# Patient Record
Sex: FEMALE | Race: WHITE | NOT HISPANIC OR LATINO | Employment: FULL TIME | URBAN - METROPOLITAN AREA
[De-identification: names, ages, dates, MRNs, and addresses within clinical notes are randomized per-mention and may not be internally consistent; named-entity substitution may affect disease eponyms.]

---

## 2018-02-02 ENCOUNTER — TELEPHONE (OUTPATIENT)
Dept: CARDIOLOGY | Facility: CLINIC | Age: 23
End: 2018-02-02

## 2018-02-02 NOTE — TELEPHONE ENCOUNTER
Pt's mother called stating the pt has been having sharp stabbing CP for 2 weeks.  States the pain radiates between her shoulder blades in her back.      Pts last OV 6/2016.  Pt was advised to ED BHE.  Pts mother states she will try to get her to the ED.  She also states the pt may refuse.      [facility] REJI Float.

## 2018-03-12 ENCOUNTER — OFFICE VISIT (OUTPATIENT)
Dept: CARDIOLOGY | Facility: CLINIC | Age: 23
End: 2018-03-12

## 2018-03-12 VITALS
HEART RATE: 74 BPM | DIASTOLIC BLOOD PRESSURE: 98 MMHG | BODY MASS INDEX: 43.99 KG/M2 | HEIGHT: 65 IN | WEIGHT: 264 LBS | SYSTOLIC BLOOD PRESSURE: 140 MMHG

## 2018-03-12 DIAGNOSIS — I36.1 NON-RHEUMATIC TRICUSPID VALVE INSUFFICIENCY: ICD-10-CM

## 2018-03-12 DIAGNOSIS — I34.0 NON-RHEUMATIC MITRAL REGURGITATION: Primary | ICD-10-CM

## 2018-03-12 DIAGNOSIS — R07.2 PRECORDIAL PAIN: ICD-10-CM

## 2018-03-12 DIAGNOSIS — IMO0001 CLASS 3 OBESITY WITH BODY MASS INDEX (BMI) OF 40.0 TO 44.9 IN ADULT, UNSPECIFIED OBESITY TYPE, UNSPECIFIED WHETHER SERIOUS COMORBIDITY PRESENT: ICD-10-CM

## 2018-03-12 PROCEDURE — 99214 OFFICE O/P EST MOD 30 MIN: CPT | Performed by: NURSE PRACTITIONER

## 2018-03-12 PROCEDURE — 93000 ELECTROCARDIOGRAM COMPLETE: CPT | Performed by: NURSE PRACTITIONER

## 2018-03-12 RX ORDER — ERGOCALCIFEROL 1.25 MG/1
50000 CAPSULE ORAL WEEKLY
COMMUNITY
End: 2020-05-18

## 2018-03-12 NOTE — PROGRESS NOTES
Date of Office Visit: 2018  Encounter Provider: MARTINEZ Bello  Place of Service: [facility] CARDIOLOGY  Patient Name: Vivien Morales  :1995    Chief Complaint   Patient presents with   • Cardiac Valve Problem   • Chest Pain   • Fatigue   :     HPI: Vivien Morales is a 22 y.o. female is a patient of Dr. meyers. I am seeing her for the first time today and have reviewed her records.  She is a patient of Dr. Edouard uGan.    Her past medical history is significant of hypothyroidism, depression, asthma, obesity, mild to moderate mitral regurgitation and tricuspid regurgitation (2015).    She had an exercise treadmill stress test on 2015 which was normal.  She had a CTA of the chest on 2015 for chest pain revealed contrast opacification of pulmonary arteries and 1.4 cm hyperenhancing lesion within the superior aspect of the left lobe of the liver.  She had a transthoracic echocardiogram completed on 16, revealed normal left ventricular function with a calculated EF of 64.5%.  Mitral valve leaflets were thickened with no obvious mitral valve prolapse.  There was also mild to moderate tricuspid valve regurgitation present with a normal RVSP.    She was last seen in the office in 2016. At that time she was complaining of some atypical chest pain and right scapular pain.  She had a ultrasound of the right upper quadrant that revealed findings suggestive of mild hepatic steatosis.  She had a cholecystectomy in  due to cholecystitis.     Since today for follow-up and has complained of fatigue and chest pain.  She describes the pain is more of a pressure that is intermittent and last from 5 minutes to one hour.  The chest pressure is not associated with eating or deep breaths.  And a couple times there has been shooting pain that lasts for a couple minutes and spontaneously resolves.  Her last episode of chest pressure was 1 month ago and  "she rates the pain a 3-6/10.   No Known Allergies     Past Medical History:   Diagnosis Date   • Asthma    • Depression    • Hypothyroidism    • Mitral regurgitation     Mild to moderate MR by echo 1/8/15   • Obesity    • Tricuspid regurgitation     Mild to moderate TR by echo on 1/8/15       Past Surgical History:   Procedure Laterality Date   • CHOLECYSTECTOMY  08/2016       Family and social history reviewed.     Review of Systems   Constitution: Positive for malaise/fatigue.   Cardiovascular: Positive for chest pain.   Respiratory: Negative for shortness of breath.      All other systems were reviewed and are negative          Objective:     Vitals:    03/12/18 1003   BP: 140/98   BP Location: Left arm   Patient Position: Sitting   Pulse: 74   Weight: 120 kg (264 lb)   Height: 165.1 cm (65\")     Body mass index is 43.93 kg/m².    PHYSICAL EXAM:  Physical Exam   Constitutional: She is oriented to person, place, and time. She appears well-developed and well-nourished. No distress.   obese   HENT:   Head: Normocephalic.   Eyes: Conjunctivae are normal.   Neck: Normal range of motion. No JVD present.   Cardiovascular: Normal rate, regular rhythm and intact distal pulses.  Exam reveals distant heart sounds.    No murmur heard.  Pulses:       Carotid pulses are 2+ on the right side, and 2+ on the left side.       Radial pulses are 2+ on the right side, and 2+ on the left side.   Pulmonary/Chest: Effort normal and breath sounds normal. No respiratory distress. She has no wheezes. She has no rhonchi. She has no rales. She exhibits no tenderness.   Abdominal: Soft. Bowel sounds are normal. She exhibits no distension.   Musculoskeletal: Normal range of motion. She exhibits no edema.   Neurological: She is alert and oriented to person, place, and time.   Skin: Skin is warm, dry and intact. No rash noted. She is not diaphoretic. No cyanosis.   Psychiatric: She has a normal mood and affect. Her behavior is normal. Judgment " and thought content normal.         ECG 12 Lead  Date/Time: 3/12/2018 9:52 AM  Performed by: BOBO ALMANZAR  Authorized by: BOBO ALMANZAR   Comparison: compared with previous ECG from 2/6/2018  Rhythm: sinus rhythm  Rate: normal  BPM: 74  ST Segments: ST segments normal  T Waves: T waves normal  QRS axis: normal  Clinical impression: normal ECG          Current Outpatient Prescriptions   Medication Sig Dispense Refill   • metaxalone (SKELAXIN) 800 MG tablet Take  by mouth.     • PROVENTIL  (90 BASE) MCG/ACT inhaler INL 2 PFS PO Q 6 H PRF WHZ  5   • vitamin D (ERGOCALCIFEROL) 47853 units capsule capsule Take 50,000 Units by mouth 1 (One) Time Per Week.       No current facility-administered medications for this visit.      Assessment:       Diagnosis Plan   1. Non-rheumatic mitral regurgitation  Adult Transthoracic Echo Complete W/ Cont if Necessary Per Protocol   2. Non-rheumatic tricuspid valve insufficiency  Adult Transthoracic Echo Complete W/ Cont if Necessary Per Protocol   3. Class 3 obesity with body mass index (BMI) of 40.0 to 44.9 in adult, unspecified obesity type, unspecified whether serious comorbidity present     4. Precordial pain  Adult Transthoracic Echo Complete W/ Cont if Necessary Per Protocol        Orders Placed This Encounter   Procedures   • ECG 12 Lead     This order was created via procedure documentation   • Adult Transthoracic Echo Complete W/ Cont if Necessary Per Protocol     Standing Status:   Future     Standing Expiration Date:   3/12/2019     Order Specific Question:   Reason for exam?     Answer:   Valvular Function     Order Specific Question:   Valvular Function specification?     Answer:   Native Valvular Regurg     Order Specific Question:   Native Valvular Regurg severity?     Answer:   Mild     Order Specific Question:   Native Valvular Regurg severity?     Answer:   Moderate       Plan:    1. Precordial pain- atypical chest pain, rated at 3-6/10 on pain scale. Last  episode was 1 onth ago. She describes it mainly being pressure with a couple episodes of shooting pain that last a couple minutes. The overall pressure can last up to an hr. Will repeat transthoracic echocardiogram to evaluate valvular function.    2. Mitral regurgitation- June 2016 echocardiogram revealed Mitral valve leaflets were thickened with no obvious mitral valve prolapse. Will repeat     3. Tricuspid regurgitation- mild grading on last echo. Will repeat     4. Morbid obesity- she does not routinely exercise and we discussed a goal of 30-45 minutes of exercise such as walking, 4 days per week.    5. Hypothyroidism- on levothyroxine      Plan of care reviewed with Dr. Colorado  Follow up in %%%  Patient was instructed to call the office if new symptoms develop or report to nearest ER if heart attack or stroke is suspected.        It has been a pleasure to participate in this patient's care.      Thank you,  MARTINEZ Bello

## 2018-03-16 ENCOUNTER — HOSPITAL ENCOUNTER (OUTPATIENT)
Dept: CARDIOLOGY | Facility: HOSPITAL | Age: 23
Discharge: HOME | End: 2018-03-16
Admitting: NURSE PRACTITIONER

## 2018-03-16 ENCOUNTER — TELEPHONE (OUTPATIENT)
Dept: CARDIOLOGY | Facility: CLINIC | Age: 23
End: 2018-03-16

## 2018-03-16 VITALS
HEIGHT: 65 IN | DIASTOLIC BLOOD PRESSURE: 90 MMHG | WEIGHT: 264 LBS | BODY MASS INDEX: 43.99 KG/M2 | HEART RATE: 67 BPM | SYSTOLIC BLOOD PRESSURE: 140 MMHG

## 2018-03-16 DIAGNOSIS — R07.2 PRECORDIAL PAIN: ICD-10-CM

## 2018-03-16 DIAGNOSIS — I34.0 NON-RHEUMATIC MITRAL REGURGITATION: ICD-10-CM

## 2018-03-16 DIAGNOSIS — I36.1 NON-RHEUMATIC TRICUSPID VALVE INSUFFICIENCY: ICD-10-CM

## 2018-03-16 LAB
ASCENDING AORTA: 2.3 CM
BH CV ECHO MEAS - ACS: 1.8 CM
BH CV ECHO MEAS - AO MAX PG (FULL): 1.9 MMHG
BH CV ECHO MEAS - AO MAX PG: 6.8 MMHG
BH CV ECHO MEAS - AO MEAN PG (FULL): 1.4 MMHG
BH CV ECHO MEAS - AO MEAN PG: 4.5 MMHG
BH CV ECHO MEAS - AO ROOT AREA (BSA CORRECTED): 1.1
BH CV ECHO MEAS - AO ROOT AREA: 5.1 CM^2
BH CV ECHO MEAS - AO ROOT DIAM: 2.5 CM
BH CV ECHO MEAS - AO V2 MAX: 129.9 CM/SEC
BH CV ECHO MEAS - AO V2 MEAN: 102 CM/SEC
BH CV ECHO MEAS - AO V2 VTI: 29.8 CM
BH CV ECHO MEAS - AVA(I,A): 2 CM^2
BH CV ECHO MEAS - AVA(I,D): 2 CM^2
BH CV ECHO MEAS - AVA(V,A): 2.1 CM^2
BH CV ECHO MEAS - AVA(V,D): 2.1 CM^2
BH CV ECHO MEAS - BSA(HAYCOCK): 2.4 M^2
BH CV ECHO MEAS - BSA: 2.2 M^2
BH CV ECHO MEAS - BZI_BMI: 43.9 KILOGRAMS/M^2
BH CV ECHO MEAS - BZI_METRIC_HEIGHT: 165.1 CM
BH CV ECHO MEAS - BZI_METRIC_WEIGHT: 119.8 KG
BH CV ECHO MEAS - CONTRAST EF (2CH): 60 ML/M^2
BH CV ECHO MEAS - CONTRAST EF 4CH: 60.5 ML/M^2
BH CV ECHO MEAS - EDV(MOD-SP2): 95 ML
BH CV ECHO MEAS - EDV(MOD-SP4): 76 ML
BH CV ECHO MEAS - EDV(TEICH): 116.9 ML
BH CV ECHO MEAS - EF(CUBED): 80.9 %
BH CV ECHO MEAS - EF(MOD-SP2): 60 %
BH CV ECHO MEAS - EF(MOD-SP4): 60.5 %
BH CV ECHO MEAS - EF(TEICH): 73.3 %
BH CV ECHO MEAS - ESV(MOD-SP2): 38 ML
BH CV ECHO MEAS - ESV(MOD-SP4): 30 ML
BH CV ECHO MEAS - ESV(TEICH): 31.2 ML
BH CV ECHO MEAS - FS: 42.4 %
BH CV ECHO MEAS - IVS/LVPW: 1
BH CV ECHO MEAS - IVSD: 1 CM
BH CV ECHO MEAS - LAT PEAK E' VEL: 20 CM/SEC
BH CV ECHO MEAS - LV DIASTOLIC VOL/BSA (35-75): 34.1 ML/M^2
BH CV ECHO MEAS - LV MASS(C)D: 176.7 GRAMS
BH CV ECHO MEAS - LV MASS(C)DI: 79.4 GRAMS/M^2
BH CV ECHO MEAS - LV MAX PG: 4.8 MMHG
BH CV ECHO MEAS - LV MEAN PG: 3.1 MMHG
BH CV ECHO MEAS - LV SYSTOLIC VOL/BSA (12-30): 13.5 ML/M^2
BH CV ECHO MEAS - LV V1 MAX: 110.1 CM/SEC
BH CV ECHO MEAS - LV V1 MEAN: 82.7 CM/SEC
BH CV ECHO MEAS - LV V1 VTI: 24.8 CM
BH CV ECHO MEAS - LVIDD: 5 CM
BH CV ECHO MEAS - LVIDS: 2.9 CM
BH CV ECHO MEAS - LVLD AP2: 8.6 CM
BH CV ECHO MEAS - LVLD AP4: 8.2 CM
BH CV ECHO MEAS - LVLS AP2: 6.9 CM
BH CV ECHO MEAS - LVLS AP4: 6.7 CM
BH CV ECHO MEAS - LVOT AREA (M): 2.5 CM^2
BH CV ECHO MEAS - LVOT AREA: 2.4 CM^2
BH CV ECHO MEAS - LVOT DIAM: 1.8 CM
BH CV ECHO MEAS - LVPWD: 0.97 CM
BH CV ECHO MEAS - MED PEAK E' VEL: 14 CM/SEC
BH CV ECHO MEAS - MR MAX PG: 90.7 MMHG
BH CV ECHO MEAS - MR MAX VEL: 476.1 CM/SEC
BH CV ECHO MEAS - MV A DUR: 0.12 SEC
BH CV ECHO MEAS - MV A MAX VEL: 59.2 CM/SEC
BH CV ECHO MEAS - MV DEC SLOPE: 460.1 CM/SEC^2
BH CV ECHO MEAS - MV DEC TIME: 0.17 SEC
BH CV ECHO MEAS - MV E MAX VEL: 85.4 CM/SEC
BH CV ECHO MEAS - MV E/A: 1.4
BH CV ECHO MEAS - MV MAX PG: 4.4 MMHG
BH CV ECHO MEAS - MV MEAN PG: 1.8 MMHG
BH CV ECHO MEAS - MV P1/2T MAX VEL: 87.3 CM/SEC
BH CV ECHO MEAS - MV P1/2T: 55.6 MSEC
BH CV ECHO MEAS - MV V2 MAX: 104.7 CM/SEC
BH CV ECHO MEAS - MV V2 MEAN: 60.6 CM/SEC
BH CV ECHO MEAS - MV V2 VTI: 26 CM
BH CV ECHO MEAS - MVA P1/2T LCG: 2.5 CM^2
BH CV ECHO MEAS - MVA(P1/2T): 4 CM^2
BH CV ECHO MEAS - MVA(VTI): 2.3 CM^2
BH CV ECHO MEAS - PA ACC TIME: 0.16 SEC
BH CV ECHO MEAS - PA MAX PG (FULL): 2.7 MMHG
BH CV ECHO MEAS - PA MAX PG: 4.2 MMHG
BH CV ECHO MEAS - PA PR(ACCEL): 6.1 MMHG
BH CV ECHO MEAS - PA V2 MAX: 102.8 CM/SEC
BH CV ECHO MEAS - PULM A REVS DUR: 0.1 SEC
BH CV ECHO MEAS - PULM A REVS VEL: 26.7 CM/SEC
BH CV ECHO MEAS - PULM DIAS VEL: 63.5 CM/SEC
BH CV ECHO MEAS - PULM S/D: 0.86
BH CV ECHO MEAS - PULM SYS VEL: 54.8 CM/SEC
BH CV ECHO MEAS - PVA(V,A): 2.2 CM^2
BH CV ECHO MEAS - PVA(V,D): 2.2 CM^2
BH CV ECHO MEAS - QP/QS: 0.89
BH CV ECHO MEAS - RV MAX PG: 1.5 MMHG
BH CV ECHO MEAS - RV MEAN PG: 0.88 MMHG
BH CV ECHO MEAS - RV V1 MAX: 61.1 CM/SEC
BH CV ECHO MEAS - RV V1 MEAN: 44.6 CM/SEC
BH CV ECHO MEAS - RV V1 VTI: 14.8 CM
BH CV ECHO MEAS - RVOT AREA: 3.6 CM^2
BH CV ECHO MEAS - RVOT DIAM: 2.2 CM
BH CV ECHO MEAS - SI(AO): 68 ML/M^2
BH CV ECHO MEAS - SI(CUBED): 44.8 ML/M^2
BH CV ECHO MEAS - SI(LVOT): 27.2 ML/M^2
BH CV ECHO MEAS - SI(MOD-SP2): 25.6 ML/M^2
BH CV ECHO MEAS - SI(MOD-SP4): 20.7 ML/M^2
BH CV ECHO MEAS - SI(TEICH): 38.5 ML/M^2
BH CV ECHO MEAS - SUP REN AO DIAM: 2.5 CM
BH CV ECHO MEAS - SV(AO): 151.4 ML
BH CV ECHO MEAS - SV(CUBED): 99.7 ML
BH CV ECHO MEAS - SV(LVOT): 60.5 ML
BH CV ECHO MEAS - SV(MOD-SP2): 57 ML
BH CV ECHO MEAS - SV(MOD-SP4): 46 ML
BH CV ECHO MEAS - SV(RVOT): 53.7 ML
BH CV ECHO MEAS - SV(TEICH): 85.7 ML
BH CV ECHO MEAS - TAPSE (>1.6): 2.6 CM2
BH CV ECHO MEAS - TR MAX VEL: 264.7 CM/SEC
BH CV XLRA - RV BASE: 3.3 CM
BH CV XLRA - TDI S': 14 CM/SEC
E/E' RATIO: 5
LEFT ATRIUM VOLUME INDEX: 15 ML/M2
LV EF 2D ECHO EST: 61 %
SINUS: 2.5 CM
STJ: 2 CM

## 2018-03-16 PROCEDURE — 93306 TTE W/DOPPLER COMPLETE: CPT

## 2018-03-16 PROCEDURE — 93306 TTE W/DOPPLER COMPLETE: CPT | Performed by: INTERNAL MEDICINE

## 2019-03-15 ENCOUNTER — OFFICE VISIT (OUTPATIENT)
Dept: CARDIOLOGY | Facility: CLINIC | Age: 24
End: 2019-03-15

## 2019-03-15 VITALS
HEIGHT: 65 IN | SYSTOLIC BLOOD PRESSURE: 138 MMHG | DIASTOLIC BLOOD PRESSURE: 86 MMHG | BODY MASS INDEX: 44.48 KG/M2 | WEIGHT: 267 LBS | OXYGEN SATURATION: 98 % | HEART RATE: 95 BPM

## 2019-03-15 DIAGNOSIS — R07.9 CHEST PAIN, UNSPECIFIED TYPE: Primary | ICD-10-CM

## 2019-03-15 DIAGNOSIS — I36.1 NON-RHEUMATIC TRICUSPID VALVE INSUFFICIENCY: ICD-10-CM

## 2019-03-15 DIAGNOSIS — I34.0 NON-RHEUMATIC MITRAL REGURGITATION: ICD-10-CM

## 2019-03-15 PROBLEM — I07.1 TRICUSPID REGURGITATION: Status: ACTIVE | Noted: 2019-03-15

## 2019-03-15 PROCEDURE — 99213 OFFICE O/P EST LOW 20 MIN: CPT | Performed by: NURSE PRACTITIONER

## 2019-03-15 PROCEDURE — 93000 ELECTROCARDIOGRAM COMPLETE: CPT | Performed by: NURSE PRACTITIONER

## 2019-03-15 RX ORDER — DESOGESTREL AND ETHINYL ESTRADIOL 0.15-0.03
1 KIT ORAL DAILY
COMMUNITY
End: 2020-05-18

## 2019-03-15 NOTE — PROGRESS NOTES
[facility]      Patient Name: Vivien Morales  :1995  Age: 23 y.o.  Primary Cardiologist: Maninder Colorado MD  Encounter Provider:  MARTINEZ Padgett      Chief Complaint:   Chief Complaint   Patient presents with   • Follow-up     1 year         HPI  Vivien Morales is a 23 y.o. female with a history significant for mitral and tricuspid valve regurgitation.  Patient presents today for annual follow-up.  Patient is new to me but I reviewed her prior medical records.  Patient states that she has done pretty well over the past year.  She reports that she is continuing to have fleeting episodes of chest pain approximately once a week.  She reports that they are the same pains that she has had for the past 2 years.  She reports that they occur when she is sitting at her desk at work and last for about 5 minutes and then go away on their own.  They are in the center portion of her chest.  She also reports some generalized fatigue but also notes that she was recently diagnosed with vitamin D deficiency and is now taking vitamin D supplements.  She denies any episodes of shortness of breath, palpitations, lightheadedness, swelling.    The following portions of the patient's history were reviewed and updated as appropriate: allergies, current medications, past family history, past medical history, past social history, past surgical history and problem list.    Current Outpatient Medications on File Prior to Visit   Medication Sig   • desogestrel-ethinyl estradiol (APRI) 0.15-30 MG-MCG per tablet Take 1 tablet by mouth Daily.   • metaxalone (SKELAXIN) 800 MG tablet Take  by mouth.   • PROVENTIL  (90 BASE) MCG/ACT inhaler INL 2 PFS PO Q 6 H PRF WHZ   • vitamin D (ERGOCALCIFEROL) 56139 units capsule capsule Take 50,000 Units by mouth 1 (One) Time Per Week.     No current facility-administered medications on file prior to visit.          Review of Systems   Constitution:  "Positive for malaise/fatigue.   Cardiovascular: Negative for chest pain and leg swelling.   Respiratory: Negative for shortness of breath.    Gastrointestinal: Positive for nausea.   Neurological: Negative for light-headedness.   Psychiatric/Behavioral: Positive for depression.   All other systems reviewed and are negative.      OBJECTIVE:   Vital Signs  Vitals:    03/15/19 1028   BP: 138/86   Pulse: 95   SpO2: 98%     Estimated body mass index is 44.43 kg/m² as calculated from the following:    Height as of this encounter: 165.1 cm (65\").    Weight as of this encounter: 121 kg (267 lb).    Physical Exam   Constitutional: She is oriented to person, place, and time. Vital signs are normal. She appears well-developed and well-nourished. She is active.   Eyes: Conjunctivae are normal.   Neck: Carotid bruit is not present.   Cardiovascular: Normal rate, regular rhythm and normal heart sounds.   Pulmonary/Chest: Breath sounds normal.   Abdominal: Normal appearance.   Musculoskeletal:   No cyanosis, clubbing, edema  Normal ROM   Neurological: She is alert and oriented to person, place, and time. GCS eye subscore is 4. GCS verbal subscore is 5. GCS motor subscore is 6.   Skin: Skin is warm, dry and intact.   Psychiatric: She has a normal mood and affect. Her speech is normal and behavior is normal. Judgment and thought content normal. Cognition and memory are normal.         ECG 12 Lead  Date/Time: 3/15/2019 10:41 AM  Performed by: Clare Silva APRN  Authorized by: Clare Silva APRN   Comparison: compared with previous ECG from 3/12/2018  Similar to previous ECG  Rhythm: sinus rhythm  Rate: normal  BPM: 79  ST Segments: ST segments normal  QRS axis: normal  Other: no other findings    Clinical impression: normal ECG            Cardiac Procedures:  1. Echocardiogram 1/8/15 at [facility] EF 60-65%.  Mild to moderate mitral regurgitation.  Mild to moderate tricuspid regurgitation.  Calculated RVSP 33 " mmHg.  The right ventricle is normal in size and function.  2. Echocardiogram 6/16/16.  EF 64%.  Normal left ventricular diastolic function.  Mitral valve leaflets are thickened with no obvious prolapse.  Mild mitral valve regurgitation.  Mild to moderate tricuspid valve regurgitation.  Cannulated RVSP 35 mmHg.  No evidence of pericardial effusion.  3. Echocardiogram 3/16/18.  EF 61%.  Trace mitral valve regurgitation.        ASSESSMENT:      Diagnosis Plan   1. Chest pain, unspecified type     2. Non-rheumatic mitral regurgitation     3. Non-rheumatic tricuspid valve insufficiency           PLAN OF CARE:     1. Chest pain.  Patient continues to have the same fleeting episodes of chest pain that she has bad for the past 2 years.  She has now had her gallbladder removed and she continues to have pain.  The pain occurs at rest typically when at work and only lasts for 5 minutes.  Patient's echocardiograms have not showed any problems with the left ventricular wall segments.  Her mitral valve and tricuspid valve regurgitation has actually improved on her echocardiogram that was done 1 year ago.  Patient does have a history of depression and I wonder if these chest pains could be associated with anxiety.  I do not feel like that they warrant any further cardiac evaluation at this time as they have been consistent in the same pain for 2 years.  2. Mitral valve regurgitation.  Denies any shortness of breath or lightheadedness.  Was improving on last echocardiogram.  3. Tricuspid valve rate regurgitation.  Improved on last echocardiogram.  4. Follow-up in 1 year with Dr. Colorado.  Sooner for problems or complications.          Thank you for allowing me to participate in the care of your patient,      Sincerely,   MARTINEZ Padgett  [facility]  03/15/19  11:21 AM    **Mayra Disclaimer:**  Much of this encounter note is an electronic transcription/translation of spoken language to printed text. The  electronic translation of spoken language may permit erroneous, or at times, nonsensical words or phrases to be inadvertently transcribed. Although I have reviewed the note for such errors, some may still exist.

## 2020-05-18 ENCOUNTER — OFFICE VISIT (OUTPATIENT)
Dept: CARDIOLOGY | Facility: CLINIC | Age: 25
End: 2020-05-18

## 2020-05-18 VITALS
OXYGEN SATURATION: 99 % | HEIGHT: 65 IN | HEART RATE: 82 BPM | DIASTOLIC BLOOD PRESSURE: 82 MMHG | BODY MASS INDEX: 44.92 KG/M2 | WEIGHT: 269.6 LBS | SYSTOLIC BLOOD PRESSURE: 128 MMHG

## 2020-05-18 DIAGNOSIS — I36.1 NONRHEUMATIC TRICUSPID VALVE REGURGITATION: ICD-10-CM

## 2020-05-18 DIAGNOSIS — I34.0 NONRHEUMATIC MITRAL VALVE REGURGITATION: ICD-10-CM

## 2020-05-18 DIAGNOSIS — R07.2 PRECORDIAL PAIN: Primary | ICD-10-CM

## 2020-05-18 PROCEDURE — 99214 OFFICE O/P EST MOD 30 MIN: CPT | Performed by: INTERNAL MEDICINE

## 2020-05-19 NOTE — PROGRESS NOTES
Date of Office Visit:  2020  Encounter Provider: Demond Colorado MD  Place of Service: [facility] CARDIOLOGY  Patient Name: Vivien Morales  :1995    Chief complaint: Follow-up for mitral regurgitation, tricuspid regurgitation.    Recurrent chest pain.    History of Present Illness:    I again had the pleasure of seeing the patient in cardiology office on 2020.  She is a very pleasant 25 year-old white female with a history of mitral and tricuspid regurgitation who presents for follow-up.  The patient initially saw me on 2015 with complaints of intermittent chest and back discomfort for approximately 1 year.  She had been experiencing pain in the center of her chest, as well as her right scapula.  She had a history of tricuspid regurgitation as a child, and had actually followed with a pediatric cardiologist.  An echocardiogram at [facility] in  showed a normal ejection fraction, although she had mild to moderate mitral regurgitation and mild to moderate tricuspid regurgitation.  An exercise treadmill stress test was normal in  as well.    She continued to have recurrent chest discomfort over the next several years.  However, her testing did not reveal any significant cardiac cause.  In fact, her valvular disease improved, with an echocardiogram on 3/16/2018 showing only trace mitral regurgitation and trace tricuspid regurgitation.    The patient presents today for follow-up.  She has developed new chest discomfort which is different from her prior symptoms radiating into the right scapula.  This is left-sided upper chest discomfort which is described as a sharp pain which sometimes has a pleuritic component.  This occurs randomly, and can last up to 10 minutes at a time.  She does state that it can be intense.  She also has had 2 episodes of lightheadedness within the last 8 months or so.  Neither of these was positional  in nature, and she did not experience syncope.  Her shortness of breath has been at baseline and is mild.    Past Medical History:   Diagnosis Date   • Asthma    • Depression    • Hypothyroidism    • Mitral regurgitation     Mild to moderate MR by echo 1/8/15   • Obesity    • Tricuspid regurgitation     Mild to moderate TR by echo on 1/8/15       Past Surgical History:   Procedure Laterality Date   • CHOLECYSTECTOMY  08/2016       Current Outpatient Medications on File Prior to Visit   Medication Sig Dispense Refill   • PROVENTIL  (90 BASE) MCG/ACT inhaler INL 2 PFS PO Q 6 H PRF WHZ  5   • [DISCONTINUED] desogestrel-ethinyl estradiol (APRI) 0.15-30 MG-MCG per tablet Take 1 tablet by mouth Daily.     • [DISCONTINUED] metaxalone (SKELAXIN) 800 MG tablet Take  by mouth.     • [DISCONTINUED] vitamin D (ERGOCALCIFEROL) 70620 units capsule capsule Take 50,000 Units by mouth 1 (One) Time Per Week.       No current facility-administered medications on file prior to visit.      Allergies as of 05/18/2020   • (No Known Allergies)     Social History     Socioeconomic History   • Marital status: Single     Spouse name: Not on file   • Number of children: Not on file   • Years of education: Not on file   • Highest education level: Not on file   Tobacco Use   • Smoking status: Never Smoker   • Smokeless tobacco: Never Used   • Tobacco comment: caffeine use   Substance and Sexual Activity   • Alcohol use: Yes     Comment: Rare    • Drug use: No   • Sexual activity: Defer     Family History   Problem Relation Age of Onset   • Stroke Maternal Grandfather    • Hypertension Mother    • Breast cancer Paternal Aunt    • Diabetes Maternal Grandmother        Review of Systems   Constitution: Positive for malaise/fatigue and weight gain.   Cardiovascular: Positive for chest pain.   Respiratory: Positive for wheezing.    Neurological: Positive for light-headedness.   All other systems reviewed and are negative.     Objective:  "    Vitals:    05/18/20 1203   BP: 128/82   Pulse: 82   SpO2: 99%   Weight: 122 kg (269 lb 9.6 oz)   Height: 165.1 cm (65\")     Body mass index is 44.86 kg/m².    Physical Exam   Constitutional: She is oriented to person, place, and time. She appears well-developed and well-nourished.   HENT:   Head: Normocephalic and atraumatic.   Eyes: Conjunctivae are normal.   Neck: Neck supple.   Cardiovascular: Normal rate and regular rhythm. Exam reveals no gallop and no friction rub.   No murmur heard.  Pulmonary/Chest: Effort normal and breath sounds normal.   Abdominal: Soft. There is no tenderness.   Musculoskeletal: She exhibits no edema.   Neurological: She is alert and oriented to person, place, and time.   Skin: Skin is warm.   Psychiatric: She has a normal mood and affect. Her behavior is normal.     Lab Review:   Procedures    Cardiac Procedures:  1.  Echocardiogram on 3/16/2018: Ejection fraction was 61%.  Diastolic function was normal.  There was trace mitral regurgitation.  There was trace tricuspid regurgitation.    Assessment:       Diagnosis Plan   1. Precordial pain  Treadmill Stress Test    Adult Transthoracic Echo Complete W/ Cont if Necessary Per Protocol   2. Nonrheumatic mitral valve regurgitation  Adult Transthoracic Echo Complete W/ Cont if Necessary Per Protocol   3. Nonrheumatic tricuspid valve regurgitation  Adult Transthoracic Echo Complete W/ Cont if Necessary Per Protocol     Plan:       The patient has left-sided chest discomfort which is new.  It is sharp and pleuritic in nature and occurs randomly.  I reassured her that I do not feel that this is cardiac in nature.  Her right-sided chest discomfort radiating to the right scapula is still present, although this has not changed much.  She also was concerned about the 2 episodes of lightheadedness in the last 8 months or so.  I told her that we can definitely look into this, although my suspicion is that this is going to be noncardiac in nature.  "     I have ordered an echocardiogram not only to evaluate her symptoms, but also to recheck her mitral and tricuspid regurgitation.  Her last echocardiogram actually showed both of these to be only trace in nature.  Her tricuspid regurgitation has been up to moderate in the past as a child, and both the tricuspid and mitral regurgitation were mild to moderate in 2015.  I also ordered an exercise treadmill stress test to ensure that she does not have exertional discomfort or EKG changes.  Her heart rate is 82, and I do not suspect that this is a pulmonary embolism, although the echocardiogram can also be utilized to look at the right ventricle.

## 2020-05-22 ENCOUNTER — HOSPITAL ENCOUNTER (OUTPATIENT)
Dept: CARDIOLOGY | Facility: HOSPITAL | Age: 25
Discharge: HOME | End: 2020-05-22
Admitting: INTERNAL MEDICINE

## 2020-05-22 VITALS
OXYGEN SATURATION: 99 % | WEIGHT: 269 LBS | BODY MASS INDEX: 44.82 KG/M2 | HEIGHT: 65 IN | HEART RATE: 91 BPM | SYSTOLIC BLOOD PRESSURE: 130 MMHG | DIASTOLIC BLOOD PRESSURE: 88 MMHG

## 2020-05-22 DIAGNOSIS — R07.2 PRECORDIAL PAIN: ICD-10-CM

## 2020-05-22 DIAGNOSIS — I34.0 NONRHEUMATIC MITRAL VALVE REGURGITATION: ICD-10-CM

## 2020-05-22 DIAGNOSIS — I36.1 NONRHEUMATIC TRICUSPID VALVE REGURGITATION: ICD-10-CM

## 2020-05-22 LAB
AORTIC ARCH: 2.4 CM
AORTIC ROOT ANNULUS: 2.1 CM
ASCENDING AORTA: 2.4 CM
BH CV ECHO MEAS - ACS: 1.8 CM
BH CV ECHO MEAS - AO MAX PG (FULL): 1.7 MMHG
BH CV ECHO MEAS - AO MAX PG: 6.2 MMHG
BH CV ECHO MEAS - AO MEAN PG (FULL): 1.4 MMHG
BH CV ECHO MEAS - AO MEAN PG: 4.1 MMHG
BH CV ECHO MEAS - AO ROOT AREA (BSA CORRECTED): 1.1
BH CV ECHO MEAS - AO ROOT AREA: 5 CM^2
BH CV ECHO MEAS - AO ROOT DIAM: 2.5 CM
BH CV ECHO MEAS - AO V2 MAX: 124.9 CM/SEC
BH CV ECHO MEAS - AO V2 MEAN: 97.1 CM/SEC
BH CV ECHO MEAS - AO V2 VTI: 28.7 CM
BH CV ECHO MEAS - ASC AORTA: 2.4 CM
BH CV ECHO MEAS - AVA(I,A): 2.2 CM^2
BH CV ECHO MEAS - AVA(I,D): 2.2 CM^2
BH CV ECHO MEAS - AVA(V,A): 2.6 CM^2
BH CV ECHO MEAS - AVA(V,D): 2.6 CM^2
BH CV ECHO MEAS - BSA(HAYCOCK): 2.4 M^2
BH CV ECHO MEAS - BSA: 2.2 M^2
BH CV ECHO MEAS - BZI_BMI: 44.8 KILOGRAMS/M^2
BH CV ECHO MEAS - BZI_METRIC_HEIGHT: 165.1 CM
BH CV ECHO MEAS - BZI_METRIC_WEIGHT: 122 KG
BH CV ECHO MEAS - EDV(MOD-SP2): 69 ML
BH CV ECHO MEAS - EDV(MOD-SP4): 66 ML
BH CV ECHO MEAS - EDV(TEICH): 90.9 ML
BH CV ECHO MEAS - EF(CUBED): 61 %
BH CV ECHO MEAS - EF(MOD-BP): 66 %
BH CV ECHO MEAS - EF(MOD-SP2): 65.2 %
BH CV ECHO MEAS - EF(MOD-SP4): 63.6 %
BH CV ECHO MEAS - EF(TEICH): 52.7 %
BH CV ECHO MEAS - ESV(MOD-SP2): 24 ML
BH CV ECHO MEAS - ESV(MOD-SP4): 24 ML
BH CV ECHO MEAS - ESV(TEICH): 43 ML
BH CV ECHO MEAS - FS: 26.9 %
BH CV ECHO MEAS - IVS/LVPW: 1
BH CV ECHO MEAS - IVSD: 0.82 CM
BH CV ECHO MEAS - LAT PEAK E' VEL: 17 CM/SEC
BH CV ECHO MEAS - LV DIASTOLIC VOL/BSA (35-75): 29.4 ML/M^2
BH CV ECHO MEAS - LV MASS(C)D: 115.3 GRAMS
BH CV ECHO MEAS - LV MASS(C)DI: 51.4 GRAMS/M^2
BH CV ECHO MEAS - LV MAX PG: 4.5 MMHG
BH CV ECHO MEAS - LV MEAN PG: 2.7 MMHG
BH CV ECHO MEAS - LV SYSTOLIC VOL/BSA (12-30): 10.7 ML/M^2
BH CV ECHO MEAS - LV V1 MAX: 106.5 CM/SEC
BH CV ECHO MEAS - LV V1 MEAN: 78.4 CM/SEC
BH CV ECHO MEAS - LV V1 VTI: 21.5 CM
BH CV ECHO MEAS - LVIDD: 4.5 CM
BH CV ECHO MEAS - LVIDS: 3.3 CM
BH CV ECHO MEAS - LVLD AP2: 7 CM
BH CV ECHO MEAS - LVLD AP4: 6.2 CM
BH CV ECHO MEAS - LVLS AP2: 5.9 CM
BH CV ECHO MEAS - LVLS AP4: 5.7 CM
BH CV ECHO MEAS - LVOT AREA (M): 3.1 CM^2
BH CV ECHO MEAS - LVOT AREA: 3 CM^2
BH CV ECHO MEAS - LVOT DIAM: 2 CM
BH CV ECHO MEAS - LVPWD: 0.82 CM
BH CV ECHO MEAS - MED PEAK E' VEL: 14 CM/SEC
BH CV ECHO MEAS - MR MAX PG: 92.7 MMHG
BH CV ECHO MEAS - MR MAX VEL: 481.4 CM/SEC
BH CV ECHO MEAS - MV A DUR: 0.09 SEC
BH CV ECHO MEAS - MV A MAX VEL: 49.6 CM/SEC
BH CV ECHO MEAS - MV DEC SLOPE: 554.6 CM/SEC^2
BH CV ECHO MEAS - MV DEC TIME: 0.24 SEC
BH CV ECHO MEAS - MV E MAX VEL: 88.8 CM/SEC
BH CV ECHO MEAS - MV E/A: 1.8
BH CV ECHO MEAS - MV MAX PG: 5.3 MMHG
BH CV ECHO MEAS - MV MEAN PG: 1.7 MMHG
BH CV ECHO MEAS - MV P1/2T MAX VEL: 114 CM/SEC
BH CV ECHO MEAS - MV P1/2T: 60.2 MSEC
BH CV ECHO MEAS - MV V2 MAX: 115.4 CM/SEC
BH CV ECHO MEAS - MV V2 MEAN: 60.1 CM/SEC
BH CV ECHO MEAS - MV V2 VTI: 32.4 CM
BH CV ECHO MEAS - MVA P1/2T LCG: 1.9 CM^2
BH CV ECHO MEAS - MVA(P1/2T): 3.7 CM^2
BH CV ECHO MEAS - MVA(VTI): 2 CM^2
BH CV ECHO MEAS - PA ACC TIME: 0.09 SEC
BH CV ECHO MEAS - PA MAX PG (FULL): 0.99 MMHG
BH CV ECHO MEAS - PA MAX PG: 3.8 MMHG
BH CV ECHO MEAS - PA PR(ACCEL): 37.4 MMHG
BH CV ECHO MEAS - PA V2 MAX: 98 CM/SEC
BH CV ECHO MEAS - PULM A REVS DUR: 0.08 SEC
BH CV ECHO MEAS - PULM A REVS VEL: 30.6 CM/SEC
BH CV ECHO MEAS - PULM DIAS VEL: 66.9 CM/SEC
BH CV ECHO MEAS - PULM S/D: 0.83
BH CV ECHO MEAS - PULM SYS VEL: 55.8 CM/SEC
BH CV ECHO MEAS - PVA(V,A): 2.9 CM^2
BH CV ECHO MEAS - PVA(V,D): 2.9 CM^2
BH CV ECHO MEAS - QP/QS: 1.1
BH CV ECHO MEAS - RAP SYSTOLE: 3 MMHG
BH CV ECHO MEAS - RV BASE (<4.1) - OBSOLETE: 2.6 CM
BH CV ECHO MEAS - RV LENGTH (<8.5) - OBSOLETE: 7.1 CM
BH CV ECHO MEAS - RV MAX PG: 2.8 MMHG
BH CV ECHO MEAS - RV MEAN PG: 1.7 MMHG
BH CV ECHO MEAS - RV V1 MAX: 84.4 CM/SEC
BH CV ECHO MEAS - RV V1 MEAN: 61.7 CM/SEC
BH CV ECHO MEAS - RV V1 VTI: 20.5 CM
BH CV ECHO MEAS - RVOT AREA: 3.4 CM^2
BH CV ECHO MEAS - RVOT DIAM: 2.1 CM
BH CV ECHO MEAS - RVSP: 30 MMHG
BH CV ECHO MEAS - SI(AO): 63.8 ML/M^2
BH CV ECHO MEAS - SI(CUBED): 24.2 ML/M^2
BH CV ECHO MEAS - SI(LVOT): 28.8 ML/M^2
BH CV ECHO MEAS - SI(MOD-SP2): 20.1 ML/M^2
BH CV ECHO MEAS - SI(MOD-SP4): 18.7 ML/M^2
BH CV ECHO MEAS - SI(TEICH): 21.3 ML/M^2
BH CV ECHO MEAS - SUP REN AO DIAM: 1.6 CM
BH CV ECHO MEAS - SV(AO): 143.1 ML
BH CV ECHO MEAS - SV(CUBED): 54.3 ML
BH CV ECHO MEAS - SV(LVOT): 64.6 ML
BH CV ECHO MEAS - SV(MOD-SP2): 45 ML
BH CV ECHO MEAS - SV(MOD-SP4): 42 ML
BH CV ECHO MEAS - SV(RVOT): 69.6 ML
BH CV ECHO MEAS - SV(TEICH): 47.9 ML
BH CV ECHO MEAS - TAPSE (>1.6): 1.9 CM2
BH CV ECHO MEAS - TR MAX VEL: 257.9 CM/SEC
BH CV ECHO MEASUREMENTS AVERAGE E/E' RATIO: 5.73
BH CV STRESS BP STAGE 1: NORMAL
BH CV STRESS BP STAGE 2: NORMAL
BH CV STRESS DURATION MIN STAGE 1: 3
BH CV STRESS DURATION MIN STAGE 2: 3
BH CV STRESS DURATION MIN STAGE 3: 1
BH CV STRESS DURATION SEC STAGE 1: 0
BH CV STRESS DURATION SEC STAGE 2: 0
BH CV STRESS DURATION SEC STAGE 3: 0
BH CV STRESS GRADE STAGE 1: 10
BH CV STRESS GRADE STAGE 2: 12
BH CV STRESS GRADE STAGE 3: 14
BH CV STRESS HR STAGE 1: 129
BH CV STRESS HR STAGE 2: 164
BH CV STRESS HR STAGE 3: 182
BH CV STRESS METS STAGE 1: 5
BH CV STRESS METS STAGE 2: 7.5
BH CV STRESS METS STAGE 3: 10
BH CV STRESS PROTOCOL 1: NORMAL
BH CV STRESS RECOVERY BP: NORMAL MMHG
BH CV STRESS RECOVERY HR: 98 BPM
BH CV STRESS SPEED STAGE 1: 1.7
BH CV STRESS SPEED STAGE 2: 2.5
BH CV STRESS SPEED STAGE 3: 3.4
BH CV STRESS STAGE 1: 1
BH CV STRESS STAGE 2: 2
BH CV STRESS STAGE 3: 3
BH CV VAS BP RIGHT ARM: NORMAL MMHG
BH CV XLRA - RV BASE: 2.6 CM
BH CV XLRA - RV LENGTH: 7.1 CM
BH CV XLRA - RV MID: 2.6 CM
BH CV XLRA - TDI S': 13 CM/SEC
LEFT ATRIUM VOLUME INDEX: 12 ML/M2
MAXIMAL PREDICTED HEART RATE: 194 BPM
PERCENT MAX PREDICTED HR: 93.81 %
SINUS: 2.4 CM
STJ: 2.5 CM
STRESS BASELINE BP: NORMAL MMHG
STRESS BASELINE HR: 78 BPM
STRESS PERCENT HR: 110 %
STRESS POST ESTIMATED WORKLOAD: 8 METS
STRESS POST EXERCISE DUR MIN: 7 MIN
STRESS POST EXERCISE DUR SEC: 0 SEC
STRESS POST PEAK BP: NORMAL MMHG
STRESS POST PEAK HR: 182 BPM
STRESS TARGET HR: 165 BPM

## 2020-05-22 PROCEDURE — 93306 TTE W/DOPPLER COMPLETE: CPT | Performed by: INTERNAL MEDICINE

## 2020-05-22 PROCEDURE — 93018 CV STRESS TEST I&R ONLY: CPT | Performed by: INTERNAL MEDICINE

## 2020-05-22 PROCEDURE — 93306 TTE W/DOPPLER COMPLETE: CPT

## 2020-05-22 PROCEDURE — 93017 CV STRESS TEST TRACING ONLY: CPT

## 2020-05-22 PROCEDURE — 93016 CV STRESS TEST SUPVJ ONLY: CPT | Performed by: INTERNAL MEDICINE

## 2021-01-06 ENCOUNTER — TELEPHONE (OUTPATIENT)
Dept: CARDIOLOGY | Facility: CLINIC | Age: 26
End: 2021-01-06

## 2021-01-06 NOTE — TELEPHONE ENCOUNTER
1/6/21   Pt called stating she had an episode yesterday that worried her and thought she may need to be seen in office.   She states she was sitting at her desk at work and she got lightheaded for about 15 minutes and she states she felt like her head dropped to were she would hit her face on her desk. She states her coworker did she her head drop down and come back up ans she had a blank stare on her face.  She states she did have left sided chest pain on Monday and chest has tenderness now.   I made her appt tomorrow to see JUAN CARLOS Parnell.  Blas BARNETT

## 2021-01-07 ENCOUNTER — OFFICE VISIT (OUTPATIENT)
Dept: CARDIOLOGY | Facility: CLINIC | Age: 26
End: 2021-01-07

## 2021-01-07 VITALS
HEIGHT: 65 IN | DIASTOLIC BLOOD PRESSURE: 86 MMHG | BODY MASS INDEX: 45.48 KG/M2 | WEIGHT: 273 LBS | SYSTOLIC BLOOD PRESSURE: 128 MMHG | HEART RATE: 64 BPM | OXYGEN SATURATION: 97 %

## 2021-01-07 DIAGNOSIS — R07.9 CHEST PAIN, UNSPECIFIED TYPE: ICD-10-CM

## 2021-01-07 DIAGNOSIS — R42 LIGHTHEADEDNESS: Primary | ICD-10-CM

## 2021-01-07 DIAGNOSIS — I36.1 NONRHEUMATIC TRICUSPID VALVE REGURGITATION: ICD-10-CM

## 2021-01-07 DIAGNOSIS — I34.0 NONRHEUMATIC MITRAL VALVE REGURGITATION: ICD-10-CM

## 2021-01-07 PROCEDURE — 99214 OFFICE O/P EST MOD 30 MIN: CPT | Performed by: NURSE PRACTITIONER

## 2021-01-07 PROCEDURE — 93000 ELECTROCARDIOGRAM COMPLETE: CPT | Performed by: NURSE PRACTITIONER

## 2021-01-07 NOTE — PROGRESS NOTES
[facility] CARDIOLOGY  [address]  [address]  [facility] [address]  Phone: [phone]      Patient Name: Vivien Morales  :1995  Age: 25 y.o.  Primary Cardiologist: Maninder Colorado MD  Encounter Provider:  MARTINEZ Padgett      Chief Complaint:   Chief Complaint   Patient presents with   • Chest Pain   • Dizziness         HPI  Vivien Morales is a 25 y.o. female who presents today for evaluation of chest pain and lightheadedness.     Pt has a  history significant for mitral valve regurgitation, tricuspid valve regurgitation, recurrent chest pain..    Review of medical record reveals patient called the office yesterday where she had an episode at work where she was sitting at her desk, got lightheaded for about 15 minutes and felt like her head was going to drop and hit the desk.  Patient reports that she had an episode of left-sided chest discomfort on Monday and now has chest tenderness.  She was scheduled for further evaluation.    Patient states that 2 days ago, while sitting at her desk she reported an episode of dizziness of sudden onset that lasted 15 minutes.  She reports that her head became heavy and almost hit the desk.  Her co-worker witnessed the episode and reported that patient had a blank stare afterwards.  Patient had eaten about 2 hours before.  She denies any heart palpitations or tachycardia episodes surrounding the event.  Prior to going to work that day patient had chest pain, but reports that it was her typical chest pain.  She denies any subsequent episodes of lightheadedness of dizziness.  She does admit to increased stress and has had a change to her sleep pattern as a result of a new job.  She has been evaluated for EUGENIA in the past and sleep study was negative.        The following portions of the patient's history were reviewed and updated as appropriate: allergies, current medications, past family history, past medical  "history, past social history, past surgical history and problem list.      Review of Systems   Constitution: Positive for malaise/fatigue.   Eyes: Positive for blurred vision.   Cardiovascular: Negative for chest pain and leg swelling.   Respiratory: Negative for shortness of breath.    Gastrointestinal: Positive for abdominal pain.   Neurological: Positive for dizziness, headaches and light-headedness.   All other systems reviewed and are negative.      OBJECTIVE:     Vitals:    01/07/21 1139   BP: 128/86   BP Location: Left arm   Patient Position: Sitting   Cuff Size: Large Adult   Pulse: 64   SpO2: 97%   Weight: 124 kg (273 lb)   Height: 165.1 cm (65\")     Body mass index is 45.43 kg/m².    Vitals signs and nursing note reviewed.   Constitutional:       Appearance: Normal appearance. Well-developed. Morbidly obese.   Eyes:      Conjunctiva/sclera: Conjunctivae normal.   Neck:      Vascular: No carotid bruit.   Pulmonary:      Breath sounds: Normal breath sounds.   Cardiovascular:      Normal rate. Regular rhythm. Normal S1 with normal intensity. Normal S2 with normal intensity.      Murmurs: There is no murmur.      No gallop. No click. No rub.   Edema:     Peripheral edema absent.   Musculoskeletal: Normal range of motion.   Skin:     General: Skin is warm and dry.   Neurological:      Mental Status: Alert and oriented to person, place, and time.      GCS: GCS eye subscore is 4. GCS verbal subscore is 5. GCS motor subscore is 6.   Psychiatric:         Speech: Speech normal.         Behavior: Behavior normal.         Thought Content: Thought content normal.         Judgment: Judgment normal.           ECG 12 Lead    Date/Time: 1/7/2021 11:56 AM  Performed by: Clare Silva APRN  Authorized by: Clare Silva APRN   Comparison: compared with previous ECG from 3/15/2019  Rhythm: sinus rhythm  Rate: normal  Conduction: conduction normal  ST Segments: ST segments normal  T Waves: T waves normal  QRS axis: " normal    Clinical impression: normal ECG            Cardiac Procedures:  1. Echocardiogram 3/16/2018.  LVEF 61%.  Diastolic function was normal.  Trace mitral regurgitation.  Trace tricuspid regurgitation.  2. Echocardiogram 5/22/2020.  LVEF 66%.  Diastolic function is normal.  Normal RV cavity size and systolic function.  Mild mitral and tricuspid valve regurgitation.  Calculated RVSP 30 mmHg.  3. Treadmill stress test 5/22/2020.  6-minute of exercise on Kobe protocol.  Griggs treadmill score is 3 putting patient in intermediate risk for CV events in the next 5 years.        ASSESSMENT:      Diagnosis Plan   1. Lightheadedness  Holter Monitor - 24 Hour   2. Nonrheumatic mitral valve regurgitation     3. Nonrheumatic tricuspid valve regurgitation     4. Chest pain, unspecified type           PLAN OF CARE:     1. Lightheadedness.  As documented above patient notes that yesterday she had an episode of 15 minutes of lightheadedness where she felt near syncopal.  She states that earlier that day she had had episodes of chest discomfort that are similar to her recurrent chest pain.  Patient reports that she was concerned because she knows that she has problems with her valves and was told if she had lightheadedness that it could be a problem with her valves.  Patient had an echocardiogram in May of this year which revealed that mitral and tricuspid valves both had mild regurgitation.  No evidence of stenosis.  Patient's systolic function is normal.  I have recommended that we place a 24-hour Holter to assess for any dysrhythmias that may have attributed.  Patient also notes that she was recently promoted at her job and her work hours have changed and it is disrupted her sleep cycle.  She states that her episode could have been related to stress and exhaustion but would like to be cautious.  If Holter monitor is unrevealing and patient continues to have symptoms I think we would need to proceed with a SUDHA  monitor.  2. Mitral valve regurgitation.  Mild on echocardiogram 5/2020.  3. Tricuspid valve regurgitation.  Mild on echocardiogram 5/2020.  4. Recurrent chest pain.  Patient continues to have unspecified atypical recurrent chest pain.  She had a treadmill stress test in May of this year which was intermediate risk for CV events in the next 5 years.  5. Follow-up to be arranged after above-mentioned testing.             Discharge Medications          Accurate as of January 7, 2021  2:15 PM. If you have any questions, ask your nurse or doctor.            Continue These Medications      Instructions Start Date   Proventil  (90 Base) MCG/ACT inhaler  Generic drug: albuterol sulfate HFA   INL 2 PFS PO Q 6 H PRF WHZ             Thank you for allowing me to participate in the care of your patient,         MARTINEZ Padgett  [facility]  01/07/21  12:14 EST      **Mayra Disclaimer:**  Much of this encounter note is an electronic transcription/translation of spoken language to printed text. The electronic translation of spoken language may permit erroneous, or at times, nonsensical words or phrases to be inadvertently transcribed. Although I have reviewed the note for such errors, some may still exist.

## 2022-12-12 ENCOUNTER — OFFICE VISIT (OUTPATIENT)
Dept: CARDIOLOGY | Facility: CLINIC | Age: 27
End: 2022-12-12

## 2022-12-12 VITALS
HEIGHT: 65 IN | BODY MASS INDEX: 44.15 KG/M2 | HEART RATE: 77 BPM | DIASTOLIC BLOOD PRESSURE: 80 MMHG | WEIGHT: 265 LBS | SYSTOLIC BLOOD PRESSURE: 130 MMHG

## 2022-12-12 DIAGNOSIS — I34.0 NONRHEUMATIC MITRAL VALVE REGURGITATION: Primary | ICD-10-CM

## 2022-12-12 DIAGNOSIS — I36.1 NONRHEUMATIC TRICUSPID VALVE REGURGITATION: ICD-10-CM

## 2022-12-12 DIAGNOSIS — R00.2 PALPITATIONS: ICD-10-CM

## 2022-12-12 PROCEDURE — 99214 OFFICE O/P EST MOD 30 MIN: CPT | Performed by: NURSE PRACTITIONER

## 2022-12-12 PROCEDURE — 93000 ELECTROCARDIOGRAM COMPLETE: CPT | Performed by: NURSE PRACTITIONER

## 2022-12-12 RX ORDER — ESCITALOPRAM OXALATE 10 MG/1
10 TABLET ORAL DAILY
COMMUNITY

## 2022-12-12 RX ORDER — AMLODIPINE BESYLATE 10 MG/1
10 TABLET ORAL DAILY
COMMUNITY

## 2022-12-12 NOTE — PROGRESS NOTES
"    CARDIOLOGY        Patient Name: Vivien Morales  :1995  Age: 27 y.o.  Primary Cardiologist: Maninder Colorado MD  Encounter Provider:  MARTINEZ Padgett    Date of Service: 22        CHIEF COMPLAINT / REASON FOR OFFICE VISIT     1 yr follow up      HISTORY OF PRESENT ILLNESS       HPI  Vivien Morales is a 27 y.o. female who presents today for annual evaluation.     Pt has a  history significant for mitral valve regurgitation, tricuspid valve regurgitation, recurrent chest pain..    Patient reports that she has recently had some exacerbation of heart symptoms.  She notes a intermittent episode of a fluttering sensation that radiates to her throat.  She states that this occurs a few times a week and is intermittent in nature.  She does admit that she has been under significant stress with both her job and she lost her mom approximately 1 year ago at this time to COVID virus.  She also notes episodes of lightheadedness that occur intermittently.  States that generalized fatigue is worse than normal.  Patient adds that she accidentally had to stop Lexapro as her local pharmacy had trouble getting the prescription.  She was off of this for approximately 2-1/2 weeks and recently restarted this a few days ago.    The following portions of the patient's history were reviewed and updated as appropriate: allergies, current medications, past family history, past medical history, past social history, past surgical history and problem list.      VITAL SIGNS     Visit Vitals  /80   Pulse 77   Ht 165.1 cm (65\")   Wt 120 kg (265 lb)   BMI 44.10 kg/m²         Wt Readings from Last 3 Encounters:   22 120 kg (265 lb)   21 124 kg (273 lb)   20 122 kg (269 lb)     Body mass index is 44.1 kg/m².      REVIEW OF SYSTEMS   Review of Systems   Constitutional: Negative for chills, fever, weight gain and weight loss.   Cardiovascular: Positive for irregular heartbeat and palpitations. " Negative for leg swelling.   Respiratory: Negative for cough, snoring and wheezing.    Hematologic/Lymphatic: Negative for bleeding problem. Does not bruise/bleed easily.   Skin: Negative for color change.   Musculoskeletal: Negative for falls, joint pain and myalgias.   Gastrointestinal: Negative for melena.   Genitourinary: Negative for hematuria.   Neurological: Positive for light-headedness. Negative for excessive daytime sleepiness.   Psychiatric/Behavioral: Negative for depression. The patient is not nervous/anxious.            PHYSICAL EXAMINATION     Vitals and nursing note reviewed.   Constitutional:       Appearance: Normal appearance. Well-developed.   Eyes:      Conjunctiva/sclera: Conjunctivae normal.   Neck:      Vascular: No carotid bruit.   Pulmonary:      Breath sounds: Normal breath sounds.   Cardiovascular:      Normal rate. Regular rhythm. Normal S1 with normal intensity. Normal S2 with normal intensity.      Murmurs: There is no murmur.      No gallop. No click. No rub.   Edema:     Peripheral edema absent.   Musculoskeletal: Normal range of motion. Skin:     General: Skin is warm and dry.   Neurological:      Mental Status: Alert and oriented to person, place, and time.      GCS: GCS eye subscore is 4. GCS verbal subscore is 5. GCS motor subscore is 6.   Psychiatric:         Speech: Speech normal.         Behavior: Behavior normal.         Thought Content: Thought content normal.         Judgment: Judgment normal.           REVIEWED DATA       ECG 12 Lead    Date/Time: 12/12/2022 11:34 AM  Performed by: Clare Silva APRN  Authorized by: Clare Silva APRN   Comparison: compared with previous ECG from 1/7/2021  Similar to previous ECG  Rhythm: sinus rhythm  Rate: normal  BPM: 77  Conduction: conduction normal  ST Segments: ST segments normal  T Waves: T waves normal  QRS axis: normal    Clinical impression: normal ECG               Cardiac Procedures:  1. Echocardiogram 3/16/2018.   LVEF 61%.  Diastolic function was normal.  Trace mitral regurgitation.  Trace tricuspid regurgitation.  2. Echocardiogram 5/22/2020.  LVEF 66%.  Diastolic function is normal.  Normal RV cavity size and systolic function.  Mild mitral and tricuspid valve regurgitation.  Calculated RVSP 30 mmHg.  3. Treadmill stress test 5/22/2020.  6-minute of exercise on Kobe protocol.  Griggs treadmill score is 3 putting patient in intermediate risk for CV events in the next 5 years.  4. 24-hour monitor 1/27/2021.  Relatively benign monitor study.      BUN   Date Value Ref Range Status   02/25/2020 7 7 - 20 mg/dL Final     Creatinine   Date Value Ref Range Status   02/25/2020 0.6 (L) 0.7 - 1.5 mg/dL Final     Potassium   Date Value Ref Range Status   02/25/2020 4.5 3.5 - 5.4 mmol/L Final     ALT (SGPT)   Date Value Ref Range Status   02/25/2020 16 13 - 69 U/L Final     AST (SGOT)   Date Value Ref Range Status   02/25/2020 16 15 - 46 U/L Final           ASSESSMENT & PLAN     Diagnoses and all orders for this visit:    1. Nonrheumatic mitral valve regurgitation (Primary)  · Mild on echocardiogram 2020  · Due for surveillance echocardiogram in 1 year  -     ECG 12 Lead  -     Adult Transthoracic Echo Complete W/ Cont if Necessary Per Protocol; Future    2. Nonrheumatic tricuspid valve regurgitation  · Mild on echocardiogram  · Due for surveillance echocardiogram in 1 year  -     Adult Transthoracic Echo Complete W/ Cont if Necessary Per Protocol; Future    3. Palpitations  · Patient reports that over the past week she has experienced intermittent episodes of a heart fluttering sensation that radiates to her chest.  She also has symptoms of lightheadedness throughout the day as well as worsening fatigue.  · She also adds that she was off of her Lexapro for approximately 2 and half weeks as her pharmacy had trouble obtaining the medication.  She recently picked up the prescription and restarted this a few days ago.  · I suspect that her  symptoms are from being off of her Lexapro, however she would like to evaluate palpitations with cardiac event monitor.  This will be placed today.  · Advised to monitor blood pressure at home and call the office for systolic blood pressure greater than 140  -     Cardiac Event Monitor; Future          Return in about 6 months (around 6/12/2023) for Dr. Mello-transitioning from Dr. Colorado.              MEDICATIONS         Discharge Medications          Accurate as of December 12, 2022 11:05 AM. If you have any questions, ask your nurse or doctor.            Continue These Medications      Instructions Start Date   amLODIPine 10 MG tablet  Commonly known as: NORVASC   10 mg, Oral, Daily      escitalopram 10 MG tablet  Commonly known as: LEXAPRO   10 mg, Oral, Daily      Proventil  (90 Base) MCG/ACT inhaler  Generic drug: albuterol sulfate HFA   INL 2 PFS PO Q 6 H PRF WHZ                 **Dragon Disclaimer:   Much of this encounter note is an electronic transcription/translation of spoken language to printed text. The electronic translation of spoken language may permit erroneous, or at times, nonsensical words or phrases to be inadvertently transcribed. Although I have reviewed the note for such errors, some may still exist.

## 2022-12-19 ENCOUNTER — HOSPITAL ENCOUNTER (OUTPATIENT)
Dept: CARDIOLOGY | Facility: HOSPITAL | Age: 27
Discharge: HOME | End: 2022-12-19
Admitting: NURSE PRACTITIONER

## 2022-12-19 VITALS
DIASTOLIC BLOOD PRESSURE: 78 MMHG | SYSTOLIC BLOOD PRESSURE: 110 MMHG | WEIGHT: 265 LBS | HEIGHT: 65 IN | HEART RATE: 78 BPM | BODY MASS INDEX: 44.15 KG/M2

## 2022-12-19 DIAGNOSIS — I34.0 NONRHEUMATIC MITRAL VALVE REGURGITATION: ICD-10-CM

## 2022-12-19 DIAGNOSIS — I36.1 NONRHEUMATIC TRICUSPID VALVE REGURGITATION: ICD-10-CM

## 2022-12-19 LAB
AORTIC ARCH: 1.9 CM
ASCENDING AORTA: 2.9 CM
BH CV ECHO MEAS - ACS: 2.04 CM
BH CV ECHO MEAS - AO MAX PG: 7.1 MMHG
BH CV ECHO MEAS - AO MEAN PG: 4 MMHG
BH CV ECHO MEAS - AO ROOT DIAM: 2.7 CM
BH CV ECHO MEAS - AO V2 MAX: 133 CM/SEC
BH CV ECHO MEAS - AO V2 VTI: 30.6 CM
BH CV ECHO MEAS - AVA(I,D): 1.89 CM2
BH CV ECHO MEAS - EDV(CUBED): 120.4 ML
BH CV ECHO MEAS - EDV(MOD-SP2): 83 ML
BH CV ECHO MEAS - EDV(MOD-SP4): 72 ML
BH CV ECHO MEAS - EF(MOD-BP): 64.4 %
BH CV ECHO MEAS - EF(MOD-SP2): 63.9 %
BH CV ECHO MEAS - EF(MOD-SP4): 65.3 %
BH CV ECHO MEAS - ESV(CUBED): 25.7 ML
BH CV ECHO MEAS - ESV(MOD-SP2): 30 ML
BH CV ECHO MEAS - ESV(MOD-SP4): 25 ML
BH CV ECHO MEAS - FS: 40.3 %
BH CV ECHO MEAS - IVS/LVPW: 1.06 CM
BH CV ECHO MEAS - IVSD: 0.85 CM
BH CV ECHO MEAS - LAT PEAK E' VEL: 18.5 CM/SEC
BH CV ECHO MEAS - LV DIASTOLIC VOL/BSA (35-75): 32.3 CM2
BH CV ECHO MEAS - LV MASS(C)D: 138.6 GRAMS
BH CV ECHO MEAS - LV MAX PG: 4.1 MMHG
BH CV ECHO MEAS - LV MEAN PG: 2 MMHG
BH CV ECHO MEAS - LV SYSTOLIC VOL/BSA (12-30): 11.2 CM2
BH CV ECHO MEAS - LV V1 MAX: 101 CM/SEC
BH CV ECHO MEAS - LV V1 VTI: 22.4 CM
BH CV ECHO MEAS - LVIDD: 4.9 CM
BH CV ECHO MEAS - LVIDS: 2.9 CM
BH CV ECHO MEAS - LVOT AREA: 2.6 CM2
BH CV ECHO MEAS - LVOT DIAM: 1.82 CM
BH CV ECHO MEAS - LVPWD: 0.8 CM
BH CV ECHO MEAS - MED PEAK E' VEL: 14.5 CM/SEC
BH CV ECHO MEAS - MR MAX PG: 104.2 MMHG
BH CV ECHO MEAS - MR MAX VEL: 510.4 CM/SEC
BH CV ECHO MEAS - MV A DUR: 0.08 SEC
BH CV ECHO MEAS - MV A MAX VEL: 53.1 CM/SEC
BH CV ECHO MEAS - MV DEC SLOPE: 826.7 CM/SEC2
BH CV ECHO MEAS - MV DEC TIME: 0.17 MSEC
BH CV ECHO MEAS - MV E MAX VEL: 100.3 CM/SEC
BH CV ECHO MEAS - MV E/A: 1.89
BH CV ECHO MEAS - MV MAX PG: 6 MMHG
BH CV ECHO MEAS - MV MEAN PG: 1.94 MMHG
BH CV ECHO MEAS - MV P1/2T: 46.6 MSEC
BH CV ECHO MEAS - MV V2 VTI: 28.6 CM
BH CV ECHO MEAS - MVA(P1/2T): 4.7 CM2
BH CV ECHO MEAS - MVA(VTI): 2.03 CM2
BH CV ECHO MEAS - PA ACC TIME: 0.17 SEC
BH CV ECHO MEAS - PA PR(ACCEL): 1.76 MMHG
BH CV ECHO MEAS - PA V2 MAX: 101.8 CM/SEC
BH CV ECHO MEAS - PULM A REVS DUR: 0.08 SEC
BH CV ECHO MEAS - PULM A REVS VEL: 25.3 CM/SEC
BH CV ECHO MEAS - PULM DIAS VEL: 69.8 CM/SEC
BH CV ECHO MEAS - PULM S/D: 1.04
BH CV ECHO MEAS - PULM SYS VEL: 72.4 CM/SEC
BH CV ECHO MEAS - QP/QS: 1.1
BH CV ECHO MEAS - RAP SYSTOLE: 3 MMHG
BH CV ECHO MEAS - RV MAX PG: 2.5 MMHG
BH CV ECHO MEAS - RV V1 MAX: 79.7 CM/SEC
BH CV ECHO MEAS - RV V1 VTI: 18.3 CM
BH CV ECHO MEAS - RVOT DIAM: 2.1 CM
BH CV ECHO MEAS - RVSP: 34 MMHG
BH CV ECHO MEAS - SI(MOD-SP2): 23.8 ML/M2
BH CV ECHO MEAS - SI(MOD-SP4): 21.1 ML/M2
BH CV ECHO MEAS - SUP REN AO DIAM: 1.8 CM
BH CV ECHO MEAS - SV(LVOT): 58 ML
BH CV ECHO MEAS - SV(MOD-SP2): 53 ML
BH CV ECHO MEAS - SV(MOD-SP4): 47 ML
BH CV ECHO MEAS - SV(RVOT): 63.7 ML
BH CV ECHO MEAS - TAPSE (>1.6): 2.26 CM
BH CV ECHO MEAS - TR MAX PG: 31.3 MMHG
BH CV ECHO MEAS - TR MAX VEL: 279.6 CM/SEC
BH CV ECHO MEASUREMENTS AVERAGE E/E' RATIO: 6.08
BH CV XLRA - RV BASE: 3.1 CM
BH CV XLRA - RV LENGTH: 7.4 CM
BH CV XLRA - RV MID: 2.9 CM
BH CV XLRA - TDI S': 13.7 CM/SEC
LEFT ATRIUM VOLUME INDEX: 21.2 ML/M2
MAXIMAL PREDICTED HEART RATE: 192 BPM
SINUS: 2.6 CM
STJ: 2.48 CM
STRESS TARGET HR: 163 BPM

## 2022-12-19 PROCEDURE — 93306 TTE W/DOPPLER COMPLETE: CPT | Performed by: INTERNAL MEDICINE

## 2022-12-19 PROCEDURE — 93306 TTE W/DOPPLER COMPLETE: CPT

## 2023-06-27 PROBLEM — I10 ESSENTIAL HYPERTENSION: Status: ACTIVE | Noted: 2023-06-27

## 2024-05-06 ENCOUNTER — TELEPHONE (OUTPATIENT)
Dept: CARDIOLOGY | Facility: CLINIC | Age: 29
End: 2024-05-06
Payer: COMMERCIAL

## 2025-07-16 ENCOUNTER — OFFICE (OUTPATIENT)
Dept: URBAN - METROPOLITAN AREA CLINIC 76 | Facility: CLINIC | Age: 30
End: 2025-07-16
Payer: COMMERCIAL

## 2025-07-16 VITALS
SYSTOLIC BLOOD PRESSURE: 134 MMHG | HEIGHT: 65 IN | HEART RATE: 79 BPM | SYSTOLIC BLOOD PRESSURE: 140 MMHG | OXYGEN SATURATION: 98 % | WEIGHT: 267 LBS | DIASTOLIC BLOOD PRESSURE: 81 MMHG | DIASTOLIC BLOOD PRESSURE: 83 MMHG

## 2025-07-16 DIAGNOSIS — R63.0 ANOREXIA: ICD-10-CM

## 2025-07-16 DIAGNOSIS — R19.7 DIARRHEA, UNSPECIFIED: ICD-10-CM

## 2025-07-16 DIAGNOSIS — R11.2 NAUSEA WITH VOMITING, UNSPECIFIED: ICD-10-CM

## 2025-07-16 PROCEDURE — 99204 OFFICE O/P NEW MOD 45 MIN: CPT

## 2025-07-16 RX ORDER — PANTOPRAZOLE SODIUM 40 MG/1
TABLET, DELAYED RELEASE ORAL
Qty: 60 | Refills: 6 | Status: ACTIVE
Start: 2025-07-16

## 2025-07-16 RX ORDER — COLESTIPOL HYDROCHLORIDE 1 G/1
TABLET, FILM COATED ORAL
Qty: 90 | Refills: 11 | Status: ACTIVE
Start: 2025-07-16